# Patient Record
Sex: FEMALE | Race: WHITE | Employment: OTHER | URBAN - METROPOLITAN AREA
[De-identification: names, ages, dates, MRNs, and addresses within clinical notes are randomized per-mention and may not be internally consistent; named-entity substitution may affect disease eponyms.]

---

## 2024-11-11 ENCOUNTER — OFFICE VISIT (OUTPATIENT)
Age: 74
End: 2024-11-11

## 2024-11-11 VITALS
HEART RATE: 82 BPM | TEMPERATURE: 98.9 F | RESPIRATION RATE: 15 BRPM | SYSTOLIC BLOOD PRESSURE: 132 MMHG | DIASTOLIC BLOOD PRESSURE: 78 MMHG | OXYGEN SATURATION: 97 % | WEIGHT: 197 LBS

## 2024-11-11 DIAGNOSIS — M54.6 ACUTE MIDLINE THORACIC BACK PAIN: Primary | ICD-10-CM

## 2024-11-11 RX ORDER — VITAMIN B COMPLEX
1 TABLET ORAL DAILY
COMMUNITY

## 2024-11-11 RX ORDER — ROSUVASTATIN CALCIUM 5 MG/1
5 TABLET, COATED ORAL DAILY
COMMUNITY
Start: 2024-09-20

## 2024-11-11 RX ORDER — METHYLPHENIDATE HYDROCHLORIDE 10 MG/1
10 TABLET ORAL DAILY
COMMUNITY
Start: 2024-10-30

## 2024-11-11 ASSESSMENT — ENCOUNTER SYMPTOMS: BACK PAIN: 1

## 2024-11-11 NOTE — PATIENT INSTRUCTIONS
Patient was seen today for back pain following a fall off of a horse  Her vital signs are all stable, physical exam is benign, normal lung sounds, normal neuroexam, no tenderness to abdomen, I have low suspicion for severe injury at this time  I do not see anything obvious on x-ray, but I am awaiting final read of thoracic and lumbar spine x-rays, I will contact you with results if the radiologist sees something abnormal  For now, I recommend that you treat symptomatically, Tylenol and or ibuprofen over-the-counter as needed for pain  Cool compresses to the affected area for comfort  Be very watchful over the next several days for signs for any worsening signs and symptoms  Observe for significant headaches, dizziness, lightheadedness, syncope, slurred speech/facial droop, weakness/numbness or tingling of extremities, loss of bowel or bladder control, shortness of breath or chest pain, abdominal pain, persistent nausea/vomiting, hematuria.  If any of these develop, please go immediately to the emergency room

## 2024-11-11 NOTE — PROGRESS NOTES
Eli Mcclain (:  1950) is a 74 y.o. female,New patient, here for evaluation of the following chief complaint(s):  Fall and Back Pain (Patient presents today with c/o back pain due to fall from horse on yesterday. Pain is a dull ache per patient. )      Assessment & Plan :  Visit Diagnoses and Associated Orders       Acute midline thoracic back pain    -  Primary    XR THORACIC SPINE (2 VIEWS) [21715 CPT(R)]   - Future Order    XR LUMBAR SPINE (2-3 VIEWS) [21237 CPT(R)]   - Future Order         ORDERS WITHOUT AN ASSOCIATED DIAGNOSIS    vitamin D (CHOLECALCIFEROL) 56523 UNIT CAPS [80319]      methylphenidate (RITALIN) 10 MG tablet [4986]      Multiple Vitamins-Minerals (DAILY COMBO MULTI VITAMINS) TABS [431]      rosuvastatin (CRESTOR) 5 MG tablet [25877]              Patient was seen today for back pain following a fall off of a horse  Her vital signs are all stable, physical exam is benign, normal lung sounds, normal neuroexam, no tenderness to abdomen, I have low suspicion for severe injury at this time  I do not see anything obvious on x-ray, but I am awaiting final read of thoracic and lumbar spine x-rays, I will contact you with results if the radiologist sees something abnormal  For now, I recommend that you treat symptomatically, Tylenol and or ibuprofen over-the-counter as needed for pain  Cool compresses to the affected area for comfort  Be very watchful over the next several days for signs for any worsening signs and symptoms  Observe for significant headaches, dizziness, lightheadedness, syncope, slurred speech/facial droop, weakness/numbness or tingling of extremities, loss of bowel or bladder control, shortness of breath or chest pain, abdominal pain, persistent nausea/vomiting, hematuria.  If any of these develop, please go immediately to the emergency room       Subjective :    Fall    Back Pain         74 y.o. female presents with symptoms of back pain after falling from a horse

## 2024-11-14 ENCOUNTER — OFFICE VISIT (OUTPATIENT)
Age: 74
End: 2024-11-14

## 2024-11-14 VITALS
TEMPERATURE: 98.4 F | DIASTOLIC BLOOD PRESSURE: 79 MMHG | RESPIRATION RATE: 18 BRPM | HEART RATE: 71 BPM | WEIGHT: 195 LBS | SYSTOLIC BLOOD PRESSURE: 146 MMHG | OXYGEN SATURATION: 97 %

## 2024-11-14 DIAGNOSIS — R07.81 RIB PAIN ON LEFT SIDE: ICD-10-CM

## 2024-11-14 DIAGNOSIS — S20.212A CONTUSION OF RIB ON LEFT SIDE, INITIAL ENCOUNTER: Primary | ICD-10-CM

## 2024-11-14 PROBLEM — T84.011A FAILURE OF LEFT TOTAL HIP ARTHROPLASTY (HCC): Status: ACTIVE | Noted: 2022-06-23

## 2024-11-14 PROBLEM — F90.9 ADHD: Status: ACTIVE | Noted: 2024-10-23

## 2024-11-14 PROBLEM — M16.9 DEGENERATIVE JOINT DISEASE (DJD) OF HIP: Status: ACTIVE | Noted: 2022-06-23

## 2024-11-14 PROBLEM — G47.33 OSA (OBSTRUCTIVE SLEEP APNEA): Status: ACTIVE | Noted: 2024-04-17

## 2024-11-14 PROBLEM — E66.9 OBESITY: Status: ACTIVE | Noted: 2024-04-17

## 2024-11-14 PROBLEM — M54.50 LOW BACK PAIN: Status: ACTIVE | Noted: 2023-08-30

## 2024-11-14 PROBLEM — R73.9 HIGH BLOOD SUGAR: Status: ACTIVE | Noted: 2024-04-17

## 2024-11-14 PROBLEM — H26.9 CATARACT: Status: ACTIVE | Noted: 2024-06-27

## 2024-11-14 PROBLEM — I10 HYPERTENSION: Status: ACTIVE | Noted: 2023-08-30

## 2024-11-14 PROBLEM — C44.310 BCC (BASAL CELL CARCINOMA), FACE: Status: ACTIVE | Noted: 2021-11-09

## 2024-11-14 NOTE — PROGRESS NOTES
Patient Name: Eli Mcclain   YOB: 1950   Patient Status: Established patient,   Chief Complaint: Rib Injury (Left side rib pain. Pt was in on monday and had back xray ed. Pt fell off a horse. )      ____________________________________________________________________________________________    External Records Reviewed: None    Limitation to History: None    Outside Historian: None    SUBJECTIVE/OBJECTIVE:  Eli Mcclain is a 74 y.o. female presents with complaint of left lateral rib pain. Symptoms began 4 days ago after falling from a horse and are worsening since onset.  She reports that she was seen in [facility] on Monday the day of the fall and had x-rays of her back which is what was hurting the most at the time. Patient describes pain as throbbing, 5/10 in severity, constant and with no radiation.  Symptoms are aggravated by movement and deep breathing and alleviated by nothing. The patient denies shortness of breath, fever or cough. No other acute symptoms reported at this time.          PAST MEDICAL HISTORY:   Medical: Pt  has no past medical history on file.  Surgical: Pt  has no past surgical history on file.  Family: Pt family history is not on file.  Social: Pt   Social History     Socioeconomic History    Marital status: Single     Spouse name: Not on file    Number of children: Not on file    Years of education: Not on file    Highest education level: Not on file   Occupational History    Not on file   Tobacco Use    Smoking status: Never    Smokeless tobacco: Never   Substance and Sexual Activity    Alcohol use: Not Currently     Comment: Rarely    Drug use: Not on file    Sexual activity: Not on file   Other Topics Concern    Not on file   Social History Narrative    Not on file     Social Determinants of Health     Financial Resource Strain: Not on file   Food Insecurity: Not on file   Transportation Needs: Not on file   Physical Activity: Not on file   Stress: Not on file

## 2024-11-14 NOTE — PATIENT INSTRUCTIONS
Continue to take deep breaths to keep lung clear, can try holding pillow to area of pain when breathing deep or coughing.  Take Tylenol and/or Motrin as directed when needed for pain.  Can take together for more severe pain or alternate every 4 hours. Someone will call with x-ray results / over read but watch MyChart and call the clinic if you see results and haven't gotten a call.  Go to ER if shortness of breath, severe increase in pain or any new or concerning symptoms